# Patient Record
Sex: FEMALE | Race: WHITE | NOT HISPANIC OR LATINO | URBAN - NONMETROPOLITAN AREA
[De-identification: names, ages, dates, MRNs, and addresses within clinical notes are randomized per-mention and may not be internally consistent; named-entity substitution may affect disease eponyms.]

---

## 2019-03-19 ENCOUNTER — APPOINTMENT (RX ONLY)
Dept: URBAN - NONMETROPOLITAN AREA CLINIC 30 | Facility: CLINIC | Age: 69
Setting detail: DERMATOLOGY
End: 2019-03-19

## 2019-03-19 VITALS — DIASTOLIC BLOOD PRESSURE: 70 MMHG | HEIGHT: 68 IN | SYSTOLIC BLOOD PRESSURE: 118 MMHG | WEIGHT: 172 LBS

## 2019-03-19 DIAGNOSIS — D49.2 NEOPLASM OF UNSPECIFIED BEHAVIOR OF BONE, SOFT TISSUE, AND SKIN: ICD-10-CM

## 2019-03-19 PROCEDURE — ? COUNSELING

## 2019-03-19 PROCEDURE — 11102 TANGNTL BX SKIN SINGLE LES: CPT

## 2019-03-19 PROCEDURE — ? BIOPSY BY SHAVE METHOD

## 2019-03-19 ASSESSMENT — LOCATION DETAILED DESCRIPTION DERM: LOCATION DETAILED: NASAL DORSUM

## 2019-03-19 ASSESSMENT — LOCATION SIMPLE DESCRIPTION DERM: LOCATION SIMPLE: NOSE

## 2019-03-19 ASSESSMENT — LOCATION ZONE DERM: LOCATION ZONE: NOSE

## 2019-03-19 NOTE — PROCEDURE: BIOPSY BY SHAVE METHOD
Anesthesia Volume In Cc: 0.6
Consent: Written consent was obtained and risks were reviewed including but not limited to scarring, infection, bleeding, scabbing, incomplete removal, nerve damage and allergy to anesthesia.
Was A Bandage Applied: Yes
Biopsy Method: Personna blade
Render Post-Care Instructions In Note?: no
Electrodesiccation Text: The wound bed was treated with electrodesiccation after the biopsy was performed.
Additional Anesthesia Volume In Cc (Will Not Render If 0): 0
Post-Care Instructions: I reviewed with the patient in detail post-care instructions. Patient is to keep the biopsy site dry overnight, and then apply bacitracin twice daily until healed. Patient may apply hydrogen peroxide soaks to remove any crusting.
Biopsy Type: H and E
Cryotherapy Text: The wound bed was treated with cryotherapy after the biopsy was performed.
Wound Care: Aquaphor
Type Of Destruction Used: Electrodesiccation
Notification Instructions: Patient will be notified of biopsy results. However, patient instructed to call the office if not contacted within 2 weeks.
Curettage Text: The wound bed was treated with curettage after the biopsy was performed.
Dressing: Band-Aid
Detail Level: Detailed
Electrodesiccation And Curettage Text: The wound bed was treated with electrodesiccation and curettage after the biopsy was performed.
Hemostasis: Electrocautery
Silver Nitrate Text: The wound bed was treated with silver nitrate after the biopsy was performed.
Anesthesia Type: 1% lidocaine with epinephrine
Depth Of Biopsy: dermis
Billing Type: Third-Party Bill

## 2019-03-19 NOTE — PROCEDURE: MIPS QUALITY
Quality 111:Pneumonia Vaccination Status For Older Adults: Pneumococcal Vaccination Previously Received
Quality 265: Biopsy Follow-Up: Biopsy results reviewed, communicated, tracked, and documented
Quality 431: Preventive Care And Screening: Unhealthy Alcohol Use - Screening: Patient screened for unhealthy alcohol use using a single question and scores less than 2 times per year
Quality 226: Preventive Care And Screening: Tobacco Use: Screening And Cessation Intervention: Tobacco Screening not Performed for Medical Reasons
Quality 110: Preventive Care And Screening: Influenza Immunization: Influenza Immunization Administered during Influenza season
Quality 128: Preventive Care And Screening: Body Mass Index (Bmi) Screening And Follow-Up Plan: BMI is documented above normal parameters and a follow-up plan is documented
Quality 130: Documentation Of Current Medications In The Medical Record: Current Medications Documented
Detail Level: Generalized

## 2019-04-16 ENCOUNTER — APPOINTMENT (RX ONLY)
Dept: URBAN - NONMETROPOLITAN AREA CLINIC 30 | Facility: CLINIC | Age: 69
Setting detail: DERMATOLOGY
End: 2019-04-16

## 2019-04-16 VITALS — DIASTOLIC BLOOD PRESSURE: 80 MMHG | HEIGHT: 68.5 IN | SYSTOLIC BLOOD PRESSURE: 126 MMHG | WEIGHT: 170 LBS

## 2019-04-16 DIAGNOSIS — L81.4 OTHER MELANIN HYPERPIGMENTATION: ICD-10-CM

## 2019-04-16 DIAGNOSIS — L57.0 ACTINIC KERATOSIS: ICD-10-CM

## 2019-04-16 DIAGNOSIS — L85.3 XEROSIS CUTIS: ICD-10-CM

## 2019-04-16 PROCEDURE — ? PATIENT SPECIFIC COUNSELING

## 2019-04-16 PROCEDURE — 99213 OFFICE O/P EST LOW 20 MIN: CPT

## 2019-04-16 PROCEDURE — ? PRESCRIPTION

## 2019-04-16 PROCEDURE — ? COUNSELING

## 2019-04-16 RX ORDER — FLUOROURACIL 2 G/40G
CREAM TOPICAL BID
Qty: 1 | Refills: 0 | Status: ERX | COMMUNITY
Start: 2019-04-16

## 2019-04-16 RX ORDER — CLOBETASOL PROPIONATE 0.5 MG/G
OINTMENT TOPICAL
Qty: 1 | Refills: 2 | Status: ERX | COMMUNITY
Start: 2019-04-16

## 2019-04-16 RX ADMIN — FLUOROURACIL: 2 CREAM TOPICAL at 00:00

## 2019-04-16 RX ADMIN — CLOBETASOL PROPIONATE: 0.5 OINTMENT TOPICAL at 00:00

## 2019-04-16 ASSESSMENT — LOCATION SIMPLE DESCRIPTION DERM
LOCATION SIMPLE: CHEST
LOCATION SIMPLE: RIGHT PRETIBIAL REGION
LOCATION SIMPLE: NOSE
LOCATION SIMPLE: LEFT PRETIBIAL REGION

## 2019-04-16 ASSESSMENT — LOCATION ZONE DERM
LOCATION ZONE: LEG
LOCATION ZONE: TRUNK
LOCATION ZONE: NOSE

## 2019-04-16 ASSESSMENT — LOCATION DETAILED DESCRIPTION DERM
LOCATION DETAILED: NASAL DORSUM
LOCATION DETAILED: LEFT PROXIMAL PRETIBIAL REGION
LOCATION DETAILED: RIGHT PROXIMAL PRETIBIAL REGION
LOCATION DETAILED: UPPER STERNUM

## 2019-04-16 NOTE — PROCEDURE: MIPS QUALITY
Quality 265: Biopsy Follow-Up: Biopsy results reviewed, communicated, tracked, and documented
Quality 431: Preventive Care And Screening: Unhealthy Alcohol Use - Screening: Patient screened for unhealthy alcohol use using a single question and scores less than 2 times per year
Quality 130: Documentation Of Current Medications In The Medical Record: Current Medications Documented
Quality 226: Preventive Care And Screening: Tobacco Use: Screening And Cessation Intervention: Tobacco Screening not Performed for Medical Reasons
Quality 128: Preventive Care And Screening: Body Mass Index (Bmi) Screening And Follow-Up Plan: BMI is documented above normal parameters and a follow-up plan is documented
Detail Level: Generalized
Quality 110: Preventive Care And Screening: Influenza Immunization: Influenza Immunization Administered during Influenza season
Quality 111:Pneumonia Vaccination Status For Older Adults: Pneumococcal Vaccination Previously Received

## 2019-04-16 NOTE — PROCEDURE: PATIENT SPECIFIC COUNSELING
New problem . She states has old tuna triamcinolone and used once daily, no better, wants stronger me\\n\\n\\nOn right distal tibia only pink dry cracked patch\\n\\nDiscussed clobetasol for no longer than 3 weeks
Detail Level: Simple
3 mm pink macule, smooth. Advised effudex bc of adenexal extension.\\n\\nRV 3 mo

## 2019-04-18 ENCOUNTER — APPOINTMENT (RX ONLY)
Dept: URBAN - NONMETROPOLITAN AREA CLINIC 30 | Facility: CLINIC | Age: 69
Setting detail: DERMATOLOGY
End: 2019-04-18

## 2019-04-18 DIAGNOSIS — L85.3 XEROSIS CUTIS: ICD-10-CM

## 2019-04-18 PROCEDURE — ? IN-HOUSE DISPENSING PHARMACY

## 2019-04-18 PROCEDURE — ? COUNSELING

## 2019-04-18 PROCEDURE — ? PATIENT SPECIFIC COUNSELING

## 2019-04-18 ASSESSMENT — LOCATION DETAILED DESCRIPTION DERM
LOCATION DETAILED: LEFT PROXIMAL PRETIBIAL REGION
LOCATION DETAILED: RIGHT PROXIMAL PRETIBIAL REGION

## 2019-04-18 ASSESSMENT — LOCATION ZONE DERM: LOCATION ZONE: LEG

## 2019-04-18 ASSESSMENT — LOCATION SIMPLE DESCRIPTION DERM
LOCATION SIMPLE: RIGHT PRETIBIAL REGION
LOCATION SIMPLE: LEFT PRETIBIAL REGION

## 2019-04-18 NOTE — PROCEDURE: PATIENT SPECIFIC COUNSELING
Patient called and ointment will be $300 insurance will not cover rx.\\n\\nSig: Apply ointment to rash on legs BID For up to 3 weeks.
Detail Level: Simple

## 2019-04-18 NOTE — PROCEDURE: IN-HOUSE DISPENSING PHARMACY
Product 51 Refills: 0
Name Of Product 41: AK Imiquimod Gel
Product 7 Application Directions: Apply every morning.
Product 25 Unit Type: grams
Product 24 Price/Unit (In Dollars): 50.00
Product 33 Unit Type: mg
Product 9 Application Directions: Apply to face QHS
Product 43 Amount/Unit (Numbers Only): 60
Product 21 Unit Type: ml
Product 29 Amount/Unit (Numbers Only): 120
Product 42 Price/Unit (In Dollars): 45.00
Product 8 Application Directions: Apply a pea size amount once nightly every third night for 3 weeks, then every other night for 3 weeks working up to nightly.
Name Of Product 29: Triamcinolone Cream
Product 1 Application Directions: Use once daily and rinse off.
Name Of Product 22: Clobetasol Cream
Name Of Product 4: BP 2.5% / Clindamycin Combination Gel
Product 12 Application Directions: Apply to face once daily.
Product 32 Amount/Unit (Numbers Only): 30
Name Of Product 7: Dapsone 6% Gel
Product 23 Units Dispensed: 1
Name Of Product 5: BP 8% Acne Wash
Name Of Product 2: Acne Triple Gel Combination
Name Of Product 31: Eczema Moisturizing Cream
Product 4 Refills: 3
Send Charges To Patient Encounter: Yes
Name Of Product 26: Fungal Dermatitis Cream
Product 22 Application Directions: Apply BID to forearm for up to four weeks.
Name Of Product 1: Acne Rosacea Lotion Cleanser
Product 8 Refills: 4
Product 44 Amount/Unit (Numbers Only): 240
Name Of Product 8: Hydrating 0.05% Tretinoin Cream
Name Of Product 42: Skin Brightening Hydroquinone 8% Combination
Product 41 Application Directions: Apply to face 2 nights a week for 4 months
Product 23 Application Directions: Apply BID for two weeks
Product 1 Price/Unit (In Dollars): 35.00
Product 31 Price/Unit (In Dollars): 40.00
Name Of Product 6: Clindamycin Gel
Product 29 Price/Unit (In Dollars): 65.00
Product 44 Price/Unit (In Dollars): 90.00
Name Of Product 10: Female Hormonal Acne Gel
Name Of Product 23: Clobetasol Ointment
Product 32 Application Directions: Apply twice daily to affected area eyelids and neck.
Name Of Product 11: Metronidazole Gel
Product 42 Application Directions: Apply to dark spots BID.
Name Of Product 44: Anti-aging Body Tretinoin 0.05% Cream
Product 7 Refills: 6
Name Of Product 3: Adapalene 0.3% Gel
Name Of Product 27: Hydrocortisone 2.5% / Tranilast 0.5% / Levo 2%
Name Of Product 32: Tacrolimus 0.1% Ointment
Name Of Product 43: Alopecia Topical Solution for Men
Name Of Product 9: Tretinoin 0.025% / Clindamycin Combination Cream
Name Of Product 24: Fluocinolone Scalp and Body Oil
Name Of Product 12: Rosacea Triple Combination Gel
Detail Level: Simple
Name Of Product 25: Fluocinonide Cream
Product 28 Price/Unit (In Dollars): 30.00
Name Of Product 21: Anti-Fungal Shampoo

## 2019-04-18 NOTE — PROCEDURE: MIPS QUALITY
Detail Level: Generalized
Quality 111:Pneumonia Vaccination Status For Older Adults: Pneumococcal Vaccination Previously Received
Quality 265: Biopsy Follow-Up: Biopsy results reviewed, communicated, tracked, and documented
Quality 130: Documentation Of Current Medications In The Medical Record: Current Medications Documented
Quality 431: Preventive Care And Screening: Unhealthy Alcohol Use - Screening: Patient screened for unhealthy alcohol use using a single question and scores less than 2 times per year
Quality 226: Preventive Care And Screening: Tobacco Use: Screening And Cessation Intervention: Tobacco Screening not Performed for Medical Reasons
Quality 128: Preventive Care And Screening: Body Mass Index (Bmi) Screening And Follow-Up Plan: BMI is documented above normal parameters and a follow-up plan is documented
Quality 110: Preventive Care And Screening: Influenza Immunization: Influenza Immunization Administered during Influenza season

## 2019-07-30 ENCOUNTER — APPOINTMENT (RX ONLY)
Dept: URBAN - NONMETROPOLITAN AREA CLINIC 30 | Facility: CLINIC | Age: 69
Setting detail: DERMATOLOGY
End: 2019-07-30

## 2019-07-30 VITALS — SYSTOLIC BLOOD PRESSURE: 124 MMHG | WEIGHT: 170 LBS | DIASTOLIC BLOOD PRESSURE: 78 MMHG | HEIGHT: 68 IN

## 2019-07-30 DIAGNOSIS — L57.0 ACTINIC KERATOSIS: ICD-10-CM

## 2019-07-30 PROCEDURE — ? COUNSELING

## 2019-07-30 PROCEDURE — 99212 OFFICE O/P EST SF 10 MIN: CPT

## 2019-07-30 PROCEDURE — ? PATIENT SPECIFIC COUNSELING

## 2019-07-30 ASSESSMENT — LOCATION DETAILED DESCRIPTION DERM: LOCATION DETAILED: NASAL DORSUM

## 2019-07-30 ASSESSMENT — LOCATION ZONE DERM: LOCATION ZONE: NOSE

## 2019-07-30 ASSESSMENT — LOCATION SIMPLE DESCRIPTION DERM: LOCATION SIMPLE: NOSE

## 2019-07-30 NOTE — PROCEDURE: MIPS QUALITY
Quality 226: Preventive Care And Screening: Tobacco Use: Screening And Cessation Intervention: Tobacco Screening not Performed for Medical Reasons
Detail Level: Generalized
Quality 128: Preventive Care And Screening: Body Mass Index (Bmi) Screening And Follow-Up Plan: BMI is documented above normal parameters and a follow-up plan is documented
Quality 111:Pneumonia Vaccination Status For Older Adults: Pneumococcal Vaccination Previously Received
Quality 110: Preventive Care And Screening: Influenza Immunization: Influenza Immunization Administered during Influenza season
Quality 431: Preventive Care And Screening: Unhealthy Alcohol Use - Screening: Patient screened for unhealthy alcohol use using a single question and scores less than 2 times per year
Quality 265: Biopsy Follow-Up: Biopsy results reviewed, communicated, tracked, and documented
Quality 130: Documentation Of Current Medications In The Medical Record: Current Medications Documented

## 2019-07-30 NOTE — PROCEDURE: PATIENT SPECIFIC COUNSELING
3 mm pink macule, smooth. Patient used effudex bc of adenexal extension.  Patient here for return visit to recheck site.  Everything looks great.
Detail Level: Simple

## 2020-05-01 ENCOUNTER — APPOINTMENT (RX ONLY)
Dept: URBAN - NONMETROPOLITAN AREA CLINIC 30 | Facility: CLINIC | Age: 70
Setting detail: DERMATOLOGY
End: 2020-05-01

## 2020-05-01 ENCOUNTER — APPOINTMENT (RX ONLY)
Dept: URBAN - NONMETROPOLITAN AREA CLINIC 27 | Facility: CLINIC | Age: 70
Setting detail: DERMATOLOGY
End: 2020-05-01

## 2020-05-01 DIAGNOSIS — L85.3 XEROSIS CUTIS: ICD-10-CM

## 2020-05-01 DIAGNOSIS — L57.8 OTHER SKIN CHANGES DUE TO CHRONIC EXPOSURE TO NONIONIZING RADIATION: ICD-10-CM

## 2020-05-01 PROBLEM — J30.1 ALLERGIC RHINITIS DUE TO POLLEN: Status: ACTIVE | Noted: 2020-05-01

## 2020-05-01 PROCEDURE — ? PATIENT SPECIFIC COUNSELING

## 2020-05-01 PROCEDURE — ? PRESCRIPTION

## 2020-05-01 PROCEDURE — 99213 OFFICE O/P EST LOW 20 MIN: CPT

## 2020-05-01 PROCEDURE — ? COUNSELING

## 2020-05-01 RX ORDER — FLUOCINONIDE 0.5 MG/G
OINTMENT TOPICAL BID
Qty: 1 | Refills: 1 | Status: ERX | COMMUNITY
Start: 2020-05-01

## 2020-05-01 RX ORDER — TRETIONIN 0.25 MG/G
CREAM TOPICAL
Qty: 1 | Refills: 2 | Status: ERX | COMMUNITY
Start: 2020-05-01

## 2020-05-01 RX ADMIN — FLUOCINONIDE: 0.5 OINTMENT TOPICAL at 00:00

## 2020-05-01 RX ADMIN — TRETIONIN: 0.25 CREAM TOPICAL at 00:00

## 2020-05-01 ASSESSMENT — LOCATION DETAILED DESCRIPTION DERM
LOCATION DETAILED: RIGHT PROXIMAL PRETIBIAL REGION
LOCATION DETAILED: LEFT PROXIMAL PRETIBIAL REGION

## 2020-05-01 ASSESSMENT — LOCATION ZONE DERM: LOCATION ZONE: LEG

## 2020-05-01 ASSESSMENT — LOCATION SIMPLE DESCRIPTION DERM
LOCATION SIMPLE: LEFT PRETIBIAL REGION
LOCATION SIMPLE: RIGHT PRETIBIAL REGION

## 2020-05-01 NOTE — PROCEDURE: MIPS QUALITY
Quality 128: Preventive Care And Screening: Body Mass Index (Bmi) Screening And Follow-Up Plan: BMI is documented above normal parameters and a follow-up plan is documented
Quality 431: Preventive Care And Screening: Unhealthy Alcohol Use - Screening: Patient screened for unhealthy alcohol use using a single question and scores less than 2 times per year
Quality 265: Biopsy Follow-Up: Biopsy results reviewed, communicated, tracked, and documented
Quality 130: Documentation Of Current Medications In The Medical Record: Current Medications Documented
Detail Level: Generalized
Quality 110: Preventive Care And Screening: Influenza Immunization: Influenza Immunization Administered during Influenza season
Quality 111:Pneumonia Vaccination Status For Older Adults: Pneumococcal Vaccination Previously Received
Quality 226: Preventive Care And Screening: Tobacco Use: Screening And Cessation Intervention: Tobacco Screening not Performed for Medical Reasons

## 2020-05-01 NOTE — PROCEDURE: PATIENT SPECIFIC COUNSELING
She has been using clobetasol prn itch.\\nRed patch right ant tibia only\\nChange topical steroid, use until redness gone not just prn itch. Rv 3; weeks. \\nIf still not better will consider patch test
Detail Level: Detailed

## 2020-05-26 ENCOUNTER — APPOINTMENT (RX ONLY)
Dept: URBAN - NONMETROPOLITAN AREA CLINIC 30 | Facility: CLINIC | Age: 70
Setting detail: DERMATOLOGY
End: 2020-05-26

## 2020-05-26 DIAGNOSIS — L81.4 OTHER MELANIN HYPERPIGMENTATION: ICD-10-CM

## 2020-05-26 DIAGNOSIS — L57.8 OTHER SKIN CHANGES DUE TO CHRONIC EXPOSURE TO NONIONIZING RADIATION: ICD-10-CM

## 2020-05-26 DIAGNOSIS — L65.9 NONSCARRING HAIR LOSS, UNSPECIFIED: ICD-10-CM

## 2020-05-26 DIAGNOSIS — L85.3 XEROSIS CUTIS: ICD-10-CM

## 2020-05-26 PROCEDURE — 99213 OFFICE O/P EST LOW 20 MIN: CPT

## 2020-05-26 PROCEDURE — ? PATIENT SPECIFIC COUNSELING

## 2020-05-26 PROCEDURE — ? COUNSELING

## 2020-05-26 PROCEDURE — ? IN-HOUSE DISPENSING PHARMACY

## 2020-05-26 PROCEDURE — ? ORDER TESTS

## 2020-05-26 ASSESSMENT — LOCATION DETAILED DESCRIPTION DERM
LOCATION DETAILED: LEFT INFERIOR MEDIAL FOREHEAD
LOCATION DETAILED: RIGHT PROXIMAL PRETIBIAL REGION
LOCATION DETAILED: RIGHT SUPERIOR PARIETAL SCALP
LOCATION DETAILED: LEFT PROXIMAL PRETIBIAL REGION
LOCATION DETAILED: STERNAL NOTCH

## 2020-05-26 ASSESSMENT — LOCATION SIMPLE DESCRIPTION DERM
LOCATION SIMPLE: SCALP
LOCATION SIMPLE: LEFT FOREHEAD
LOCATION SIMPLE: LEFT PRETIBIAL REGION
LOCATION SIMPLE: RIGHT PRETIBIAL REGION
LOCATION SIMPLE: CHEST

## 2020-05-26 ASSESSMENT — LOCATION ZONE DERM
LOCATION ZONE: FACE
LOCATION ZONE: TRUNK
LOCATION ZONE: LEG
LOCATION ZONE: SCALP

## 2020-05-26 NOTE — PROCEDURE: IN-HOUSE DISPENSING PHARMACY
Name Of Product 6: Clindamycin Gel
Name Of Product 26: Ketoconazole/ hydrocortisone cream
Product 41 Unit Type: grams
Product 21 Units Dispensed: 0
Product 14 Unit Type: mg
Product 25 Price/Unit (In Dollars): 40.00
Product 8 Price/Unit (In Dollars): 45.00
Product 1 Refills: 3
Product 32 Application Directions: Apply twice daily to affected areas.
Product 24 Amount/Unit (Numbers Only): 60
Product 9 Units Dispensed: 1
Name Of Product 13: Rosacea Cream
Product 10 Price/Unit (In Dollars): 50.00
Product 9 Refills: 4
Name Of Product 32: Tacrolimus 0.1% Ointment
Product 10 Application Directions: Apply topically to entire face once a day to start then gradually increase to twice a day
Product 1 Price/Unit (In Dollars): 35.00
Name Of Product 29: Triamcinolone Cream
Product 7 Amount/Unit (Numbers Only): 30
Product 44 Price/Unit (In Dollars): 90.00
Product 22 Application Directions: Apply BID to affected areas
Name Of Product 31: Urea 40% cream
Name Of Product 24: Fluocinolone Scalp and Body Oil
Name Of Product 1: Sodium sulfacetamide 8%
Product 7 Application Directions: Apply every morning.
Name Of Product 3: Adapalene 0.3% Gel
Product 5 Unit Type: ml
Product 8 Application Directions: Apply a pea size amount once nightly every other night for 3 weeks, then working up to [facility]
Name Of Product 5: BP 8% Acne Wash
Product 1 Application Directions: Use once daily and rinse off.
Name Of Product 45: Skin brightening hydroquinone 8% combination sensitive skin
Name Of Product 42: Skin Brightening Hydroquinone 8% Combination
Name Of Product 36: Clobetasol solution
Name Of Product 11: Metronidazole Gel
Render Refills If Set To 0: Yes
Name Of Product 9: Tretinoin 0.025% / Clindamycin Combination Cream
Name Of Product 25: Fluocinonide Cream
Name Of Product 23: Clobetasol Ointment
Detail Level: Simple
Product 29 Amount/Unit (Numbers Only): 120
Product 9 Application Directions: Apply to chest, shoulder and back once before bed time
Product 42 Application Directions: Apply to dark spots BID daily.
Product 44 Amount/Unit (Numbers Only): 240
Product 41 Application Directions: Apply to spot on nose once daily for 2 weeks.
Name Of Product 7: Dapsone 6% Gel
Name Of Product 12: Rosacea Triple Combination Gel
Name Of Product 27: Hydrocortisone 2.5% / Tranilast 0.5% / Levo 2%
Name Of Product 33: Seborrheic Dermatitis Shampoo   120ml
Name Of Product 8: Hydrating 0.05% Tretinoin Cream
Name Of Product 4: BP 2.5% / Clindamycin Combination Gel
Name Of Product 10: Female Hormonal Acne Gel
Product 23 Application Directions: Apply BID to rash on body for two weeks
Name Of Product 2: Acne Triple Gel Combination
Product 29 Price/Unit (In Dollars): 65.00
Product 26 Application Directions: Apply to rash BID for 4 weeks.
Product 4 Application Directions: Apply to affected area twice a day
Name Of Product 22: Clobetasol Cream
Name Of Product 41: AK Imiquimod Gel
Product 28 Price/Unit (In Dollars): 30.00
Product 12 Application Directions: Apply to face once daily.
Name Of Product 44: Anti-aging Body Tretinoin 0.05% Cream
Name Of Product 43: Alopecia Topical Solution for Men
Name Of Product 21: Anti-Fungal Shampoo

## 2020-05-26 NOTE — PROCEDURE: MIPS QUALITY
Quality 110: Preventive Care And Screening: Influenza Immunization: Influenza Immunization Administered during Influenza season
Quality 130: Documentation Of Current Medications In The Medical Record: Current Medications Documented
Quality 226: Preventive Care And Screening: Tobacco Use: Screening And Cessation Intervention: Tobacco Screening not Performed for Medical Reasons
Quality 265: Biopsy Follow-Up: Biopsy results reviewed, communicated, tracked, and documented
Quality 431: Preventive Care And Screening: Unhealthy Alcohol Use - Screening: Patient screened for unhealthy alcohol use using a single question and scores less than 2 times per year
Quality 128: Preventive Care And Screening: Body Mass Index (Bmi) Screening And Follow-Up Plan: BMI is documented above normal parameters and a follow-up plan is documented
Detail Level: Generalized
Quality 111:Pneumonia Vaccination Status For Older Adults: Pneumococcal Vaccination Previously Received

## 2020-05-26 NOTE — PROCEDURE: PATIENT SPECIFIC COUNSELING
Detail Level: Detailed
Recommended IPL laser treatment.
Recommended to only use prescription cream on red areas of legs. Suggested Cetaphil pro moisturizer to use on legs and arms.

## 2020-06-01 ENCOUNTER — APPOINTMENT (RX ONLY)
Dept: URBAN - NONMETROPOLITAN AREA CLINIC 30 | Facility: CLINIC | Age: 70
Setting detail: DERMATOLOGY
End: 2020-06-01

## 2020-06-01 DIAGNOSIS — L65.9 NONSCARRING HAIR LOSS, UNSPECIFIED: ICD-10-CM

## 2020-06-01 PROCEDURE — ? COUNSELING

## 2020-06-01 PROCEDURE — ? ORDER TESTS

## 2020-06-01 ASSESSMENT — LOCATION DETAILED DESCRIPTION DERM
LOCATION DETAILED: RIGHT SUPERIOR PARIETAL SCALP
LOCATION DETAILED: RIGHT SUPERIOR PARIETAL SCALP

## 2020-06-01 ASSESSMENT — LOCATION SIMPLE DESCRIPTION DERM
LOCATION SIMPLE: SCALP
LOCATION SIMPLE: SCALP

## 2020-06-01 ASSESSMENT — LOCATION ZONE DERM
LOCATION ZONE: SCALP
LOCATION ZONE: SCALP

## 2020-06-01 NOTE — PROCEDURE: MIPS QUALITY
Detail Level: Generalized
Quality 265: Biopsy Follow-Up: Biopsy results reviewed, communicated, tracked, and documented
Quality 128: Preventive Care And Screening: Body Mass Index (Bmi) Screening And Follow-Up Plan: BMI is documented above normal parameters and a follow-up plan is documented
Quality 226: Preventive Care And Screening: Tobacco Use: Screening And Cessation Intervention: Tobacco Screening not Performed for Medical Reasons
Quality 111:Pneumonia Vaccination Status For Older Adults: Pneumococcal Vaccination Previously Received
Quality 110: Preventive Care And Screening: Influenza Immunization: Influenza Immunization Administered during Influenza season
Quality 130: Documentation Of Current Medications In The Medical Record: Current Medications Documented
Quality 431: Preventive Care And Screening: Unhealthy Alcohol Use - Screening: Patient screened for unhealthy alcohol use using a single question and scores less than 2 times per year

## 2020-06-01 NOTE — PROCEDURE: MIPS QUALITY
Quality 226: Preventive Care And Screening: Tobacco Use: Screening And Cessation Intervention: Tobacco Screening not Performed for Medical Reasons
Quality 431: Preventive Care And Screening: Unhealthy Alcohol Use - Screening: Patient screened for unhealthy alcohol use using a single question and scores less than 2 times per year
Detail Level: Generalized
Quality 265: Biopsy Follow-Up: Biopsy results reviewed, communicated, tracked, and documented
Quality 111:Pneumonia Vaccination Status For Older Adults: Pneumococcal Vaccination Previously Received
Quality 128: Preventive Care And Screening: Body Mass Index (Bmi) Screening And Follow-Up Plan: BMI is documented above normal parameters and a follow-up plan is documented
Quality 110: Preventive Care And Screening: Influenza Immunization: Influenza Immunization Administered during Influenza season
Quality 130: Documentation Of Current Medications In The Medical Record: Current Medications Documented

## 2021-06-23 ENCOUNTER — APPOINTMENT (RX ONLY)
Dept: URBAN - NONMETROPOLITAN AREA CLINIC 30 | Facility: CLINIC | Age: 71
Setting detail: DERMATOLOGY
End: 2021-06-23

## 2021-06-23 DIAGNOSIS — L57.8 OTHER SKIN CHANGES DUE TO CHRONIC EXPOSURE TO NONIONIZING RADIATION: ICD-10-CM

## 2021-06-23 DIAGNOSIS — L85.3 XEROSIS CUTIS: ICD-10-CM | Status: INADEQUATELY CONTROLLED

## 2021-06-23 PROCEDURE — ? COUNSELING

## 2021-06-23 PROCEDURE — ? PRESCRIPTION

## 2021-06-23 PROCEDURE — 99213 OFFICE O/P EST LOW 20 MIN: CPT

## 2021-06-23 PROCEDURE — ? PATIENT SPECIFIC COUNSELING

## 2021-06-23 PROCEDURE — ? IN-HOUSE DISPENSING PHARMACY

## 2021-06-23 RX ORDER — FLUOCINONIDE 0.5 MG/G
OINTMENT TOPICAL BID
Qty: 1 | Refills: 1 | Status: ERX

## 2021-06-23 ASSESSMENT — LOCATION DETAILED DESCRIPTION DERM
LOCATION DETAILED: LEFT INFERIOR MEDIAL FOREHEAD
LOCATION DETAILED: LEFT PROXIMAL PRETIBIAL REGION
LOCATION DETAILED: RIGHT PROXIMAL PRETIBIAL REGION

## 2021-06-23 ASSESSMENT — LOCATION ZONE DERM
LOCATION ZONE: FACE
LOCATION ZONE: LEG

## 2021-06-23 ASSESSMENT — LOCATION SIMPLE DESCRIPTION DERM
LOCATION SIMPLE: LEFT PRETIBIAL REGION
LOCATION SIMPLE: RIGHT PRETIBIAL REGION
LOCATION SIMPLE: LEFT FOREHEAD

## 2021-06-23 NOTE — PROCEDURE: MIPS QUALITY
Detail Level: Generalized
Quality 265: Biopsy Follow-Up: Biopsy results reviewed, communicated, tracked, and documented
Quality 431: Preventive Care And Screening: Unhealthy Alcohol Use - Screening: Patient screened for unhealthy alcohol use using a single question and scores less than 2 times per year
Quality 111:Pneumonia Vaccination Status For Older Adults: Pneumococcal Vaccination Previously Received
Quality 110: Preventive Care And Screening: Influenza Immunization: Influenza Immunization Administered during Influenza season
Quality 226: Preventive Care And Screening: Tobacco Use: Screening And Cessation Intervention: Tobacco Screening not Performed for Medical Reasons
Quality 130: Documentation Of Current Medications In The Medical Record: Current Medications Documented
Quality 128: Preventive Care And Screening: Body Mass Index (Bmi) Screening And Follow-Up Plan: BMI is documented above normal parameters and a follow-up plan is documented

## 2021-06-23 NOTE — PROCEDURE: IN-HOUSE DISPENSING PHARMACY
Product 40 Unit Type: mg
Product 25 Amount/Unit (Numbers Only): 30
Product 40 Units Dispensed: 0
Product 25 Unit Type: grams
Product 43 Amount/Unit (Numbers Only): 60
Name Of Product 10: Female Hormonal Acne Gel
Product 10 Price/Unit (In Dollars): 50.00
Product 1 Amount/Unit (Numbers Only): 120
Product 1 Price/Unit (In Dollars): 35.00
Name Of Product 43: Alopecia Topical Solution for Men
Name Of Product 41: AK Imiquimod Gel
Name Of Product 11: Metronidazole Gel
Name Of Product 42: Skin Brightening Hydroquinone 8% Combination
Name Of Product 28: Triamcinolone Cream
Product 3 Price/Unit (In Dollars): 45.00
Product 41 Refills: 1
Name Of Product 26: Ketoconazole/ hydrocortisone cream
Detail Level: Simple
Render Product Pricing In Note: Yes
Product 13 Refills: 3
Name Of Product 33: Seborrheic Dermatitis Shampoo   120ml
Product 29 Price/Unit (In Dollars): 65.00
Name Of Product 1: Sodium sulfacetamide 8%
Product 9 Application Directions: Apply to chest, shoulder and back once before bed time
Product 42 Application Directions: Apply to dark spots BID daily.
Name Of Product 9: Tretinoin 0.025% / Clindamycin Combination Cream
Product 9 Refills: 4
Product 31 Price/Unit (In Dollars): 40.00
Product 8 Application Directions: Apply a pea size amount once nightly every other night for 3 weeks, then working up to [facility]
Name Of Product 44: Anti-aging Body Tretinoin 0.05% Cream
Name Of Product 45: Skin brightening hydroquinone 8% combination sensitive skin
Product 22 Application Directions: Apply BID to affected areas
Product 44 Price/Unit (In Dollars): 90.00
Product 12 Application Directions: Apply to face once daily.
Name Of Product 4: BP 2.5% / Clindamycin Combination Gel
Product 5 Unit Type: ml
Product 28 Price/Unit (In Dollars): 30.00
Product 4 Application Directions: Apply to affected area twice a day
Name Of Product 8: Hydrating 0.05% Tretinoin Cream
Name Of Product 7: Dapsone 6% Gel
Name Of Product 21: Anti-Fungal Shampoo
Name Of Product 27: Hydrocortisone 2.5% / Tranilast 0.5% / Levo 2%
Name Of Product 6: Clindamycin Gel
Name Of Product 24: Fluocinolone Scalp and Body Oil
Name Of Product 2: Acne Triple Gel Combination
Name Of Product 31: Urea 40% cream
Name Of Product 12: Rosacea Triple Combination Gel
Product 41 Application Directions: Apply to spot on nose once daily for 2 weeks.
Name Of Product 25: Fluocinonide Cream
Product 26 Application Directions: Apply to rash BID for 4 weeks.
Name Of Product 32: Tacrolimus 0.1% Ointment
Name Of Product 22: Clobetasol Cream
Product 44 Amount/Unit (Numbers Only): 240
Name Of Product 36: Clobetasol solution
Product 32 Application Directions: Apply twice daily to affected areas.
Product 1 Application Directions: Use once daily and rinse off.
Name Of Product 5: BP 8% Acne Wash
Product 7 Application Directions: Apply every morning.
Product 10 Application Directions: Apply topically to entire face once a day to start then gradually increase to twice a day
Name Of Product 23: Clobetasol Ointment
Name Of Product 3: Adapalene 0.3% Gel
Name Of Product 13: Rosacea Cream
Product 23 Application Directions: Apply BID to rash on body for two weeks

## 2021-06-23 NOTE — PROCEDURE: PATIENT SPECIFIC COUNSELING
Recommended to only use prescription cream on red areas of legs. Suggested Cetaphil pro moisturizer to use on legs and arms.
Detail Level: Detailed
New area right arm. Hasnt treated this yet\\nAdvised use fluocinonide on arm